# Patient Record
Sex: FEMALE | Race: WHITE | HISPANIC OR LATINO
[De-identification: names, ages, dates, MRNs, and addresses within clinical notes are randomized per-mention and may not be internally consistent; named-entity substitution may affect disease eponyms.]

---

## 2017-01-25 ENCOUNTER — APPOINTMENT (OUTPATIENT)
Dept: OTOLARYNGOLOGY | Facility: CLINIC | Age: 57
End: 2017-01-25

## 2017-01-25 VITALS
WEIGHT: 155 LBS | HEART RATE: 101 BPM | DIASTOLIC BLOOD PRESSURE: 78 MMHG | HEIGHT: 64 IN | SYSTOLIC BLOOD PRESSURE: 98 MMHG | BODY MASS INDEX: 26.46 KG/M2

## 2017-01-25 DIAGNOSIS — Z78.9 OTHER SPECIFIED HEALTH STATUS: ICD-10-CM

## 2017-01-25 DIAGNOSIS — Z83.3 FAMILY HISTORY OF DIABETES MELLITUS: ICD-10-CM

## 2017-01-25 DIAGNOSIS — Z80.9 FAMILY HISTORY OF MALIGNANT NEOPLASM, UNSPECIFIED: ICD-10-CM

## 2017-01-25 DIAGNOSIS — H93.13 TINNITUS, BILATERAL: ICD-10-CM

## 2017-01-25 DIAGNOSIS — Z88.9 ALLERGY STATUS TO UNSPECIFIED DRUGS, MEDICAMENTS AND BIOLOGICAL SUBSTANCES: ICD-10-CM

## 2017-01-25 DIAGNOSIS — H90.5 UNSPECIFIED SENSORINEURAL HEARING LOSS: ICD-10-CM

## 2017-01-25 DIAGNOSIS — Z87.09 PERSONAL HISTORY OF OTHER DISEASES OF THE RESPIRATORY SYSTEM: ICD-10-CM

## 2017-01-25 RX ORDER — BUPROPION HCL 150 MG
150 TABLET,SUSTAINED-RELEASE 12 HR ORAL
Refills: 0 | Status: ACTIVE | COMMUNITY

## 2017-01-25 RX ORDER — ESCITALOPRAM OXALATE 20 MG/1
20 TABLET, FILM COATED ORAL
Refills: 0 | Status: ACTIVE | COMMUNITY

## 2017-01-25 RX ORDER — ZOLPIDEM TARTRATE 5 MG/1
TABLET, FILM COATED ORAL
Refills: 0 | Status: ACTIVE | COMMUNITY

## 2017-01-25 RX ORDER — ASPIRIN 81 MG
81 TABLET, DELAYED RELEASE (ENTERIC COATED) ORAL
Refills: 0 | Status: ACTIVE | COMMUNITY

## 2017-01-25 RX ORDER — ALPRAZOLAM 2 MG/1
TABLET ORAL
Refills: 0 | Status: ACTIVE | COMMUNITY

## 2017-02-10 LAB
ALBUMIN SERPL ELPH-MCNC: 4.5 G/DL
ALP BLD-CCNC: 68 U/L
ALT SERPL-CCNC: 25 U/L
ANION GAP SERPL CALC-SCNC: 13 MMOL/L
AST SERPL-CCNC: 22 U/L
BILIRUB SERPL-MCNC: 0.2 MG/DL
BUN SERPL-MCNC: 15 MG/DL
CALCIUM SERPL-MCNC: 9.6 MG/DL
CHLORIDE SERPL-SCNC: 98 MMOL/L
CO2 SERPL-SCNC: 26 MMOL/L
CREAT SERPL-MCNC: 0.82 MG/DL
GLUCOSE SERPL-MCNC: 92 MG/DL
POTASSIUM SERPL-SCNC: 4.2 MMOL/L
PROT SERPL-MCNC: 7.7 G/DL
SODIUM SERPL-SCNC: 137 MMOL/L

## 2017-02-23 ENCOUNTER — APPOINTMENT (OUTPATIENT)
Dept: GASTROENTEROLOGY | Facility: CLINIC | Age: 57
End: 2017-02-23

## 2017-03-10 ENCOUNTER — RESULT REVIEW (OUTPATIENT)
Age: 57
End: 2017-03-10

## 2017-05-22 ENCOUNTER — APPOINTMENT (OUTPATIENT)
Dept: GASTROENTEROLOGY | Facility: CLINIC | Age: 57
End: 2017-05-22

## 2018-01-02 ENCOUNTER — APPOINTMENT (OUTPATIENT)
Dept: PSYCHIATRY | Facility: CLINIC | Age: 58
End: 2018-01-02
Payer: COMMERCIAL

## 2018-01-02 DIAGNOSIS — F41.8 OTHER SPECIFIED ANXIETY DISORDERS: ICD-10-CM

## 2018-01-02 PROCEDURE — 99205 OFFICE O/P NEW HI 60 MIN: CPT

## 2018-01-02 RX ORDER — BUPROPION HYDROCHLORIDE 150 MG/1
150 TABLET, EXTENDED RELEASE ORAL DAILY
Qty: 90 | Refills: 0 | Status: ACTIVE | COMMUNITY
Start: 2018-01-02 | End: 1900-01-01

## 2018-01-02 RX ORDER — ESCITALOPRAM OXALATE 20 MG/1
20 TABLET ORAL DAILY
Qty: 90 | Refills: 0 | Status: ACTIVE | COMMUNITY
Start: 2018-01-02 | End: 1900-01-01

## 2018-01-02 RX ORDER — BUPROPION HYDROCHLORIDE 300 MG/1
300 TABLET, EXTENDED RELEASE ORAL DAILY
Qty: 90 | Refills: 0 | Status: ACTIVE | COMMUNITY
Start: 2018-01-02 | End: 1900-01-01

## 2018-01-02 RX ORDER — ZALEPLON 10 MG/1
10 CAPSULE ORAL
Qty: 30 | Refills: 0 | Status: ACTIVE | COMMUNITY
Start: 2018-01-02 | End: 1900-01-01

## 2018-01-02 RX ORDER — ALPRAZOLAM 1 MG/1
1 TABLET ORAL TWICE DAILY
Qty: 60 | Refills: 0 | Status: ACTIVE | COMMUNITY
Start: 2018-01-02 | End: 1900-01-01

## 2018-02-27 ENCOUNTER — APPOINTMENT (OUTPATIENT)
Dept: PSYCHIATRY | Facility: CLINIC | Age: 58
End: 2018-02-27

## 2018-07-25 PROBLEM — Z78.9 ALCOHOL USE: Status: ACTIVE | Noted: 2017-01-25

## 2022-08-22 ENCOUNTER — APPOINTMENT (OUTPATIENT)
Dept: ORTHOPEDIC SURGERY | Facility: CLINIC | Age: 62
End: 2022-08-22

## 2022-08-22 PROCEDURE — 73110 X-RAY EXAM OF WRIST: CPT | Mod: RT

## 2022-08-22 PROCEDURE — 73030 X-RAY EXAM OF SHOULDER: CPT | Mod: RT

## 2022-08-22 PROCEDURE — 99203 OFFICE O/P NEW LOW 30 MIN: CPT

## 2022-08-22 RX ORDER — MEPOLIZUMAB 100 MG/ML
100 INJECTION, SOLUTION SUBCUTANEOUS
Qty: 1 | Refills: 0 | Status: ACTIVE | COMMUNITY
Start: 2022-07-29

## 2022-08-22 RX ORDER — NAPROXEN 500 MG/1
500 TABLET ORAL
Qty: 40 | Refills: 0 | Status: ACTIVE | COMMUNITY
Start: 2022-02-23

## 2022-08-22 RX ORDER — ESCITALOPRAM OXALATE 10 MG/1
10 TABLET ORAL
Qty: 30 | Refills: 0 | Status: ACTIVE | COMMUNITY
Start: 2022-07-08

## 2022-08-22 RX ORDER — PREDNISONE 5 MG/1
5 TABLET ORAL
Qty: 120 | Refills: 0 | Status: ACTIVE | COMMUNITY
Start: 2022-05-13

## 2022-08-22 RX ORDER — CELECOXIB 200 MG/1
200 CAPSULE ORAL DAILY
Qty: 30 | Refills: 0 | Status: ACTIVE | COMMUNITY
Start: 2022-08-22 | End: 1900-01-01

## 2022-08-22 RX ORDER — CICLOPIROX OLAMINE 7.7 MG/G
0.77 CREAM TOPICAL
Qty: 30 | Refills: 0 | Status: ACTIVE | COMMUNITY
Start: 2022-02-28

## 2022-08-22 RX ORDER — TOPIRAMATE 25 MG/1
25 TABLET, FILM COATED ORAL
Qty: 30 | Refills: 0 | Status: ACTIVE | COMMUNITY
Start: 2022-07-21

## 2022-08-22 RX ORDER — ALBUTEROL SULFATE 90 UG/1
108 (90 BASE) INHALANT RESPIRATORY (INHALATION)
Qty: 18 | Refills: 0 | Status: ACTIVE | COMMUNITY
Start: 2022-06-01

## 2022-08-22 RX ORDER — PREDNISONE 10 MG/1
10 TABLET ORAL
Qty: 30 | Refills: 0 | Status: ACTIVE | COMMUNITY
Start: 2022-04-28

## 2022-08-22 RX ORDER — ZOLPIDEM TARTRATE 10 MG/1
10 TABLET ORAL
Qty: 30 | Refills: 0 | Status: ACTIVE | COMMUNITY
Start: 2022-07-08

## 2022-08-22 RX ORDER — SUMATRIPTAN 100 MG/1
100 TABLET, FILM COATED ORAL
Qty: 9 | Refills: 0 | Status: ACTIVE | COMMUNITY
Start: 2022-06-15

## 2022-08-22 RX ORDER — BECLOMETHASONE DIPROPIONATE HFA 80 UG/1
80 AEROSOL, METERED RESPIRATORY (INHALATION)
Qty: 11 | Refills: 0 | Status: ACTIVE | COMMUNITY
Start: 2022-06-01

## 2022-08-23 NOTE — END OF VISIT
[FreeTextEntry3] : All medical record entries made by the Scribe were at my,  Dr. Cody Mario MD., direction and personally dictated by me on 08/22/2022. I have personally reviewed the chart and agree that the record accurately reflects my personal performance of the history, physical exam, assessment and plan.

## 2022-08-23 NOTE — HISTORY OF PRESENT ILLNESS
[de-identified] : Pt is a 61 y/o female c/o right shoulder pain x 4 months.  Denies injury.  She has pain when she lifts her arm overhead.  The pain is sharp.  She does not have much pain at other times although she states that the shoulder aches at rest intermittently.  \par She also c/o right wrist pain for many years.  She has been told that she has tendinitis.  She states that she feel like she has to wrap the wrist for stabilization.

## 2022-08-23 NOTE — DISCUSSION/SUMMARY
[de-identified] : The underlying pathophysiology was reviewed with the patient. XR films were reviewed with the patient. Discussed at length the nature of the patient’s condition.\par \par With regard to the right shoulder, we discussed treatment recommendations consisting of a cortisone injection, PT and oral as well as topical antiinflammatories. She deferred a cortisone injection and would like to proceed with PT at this time.\par The patient wishes to proceed with physical therapy. A script was given.		\par \par With regard to the right wrist, she deferred a cortisone injection. I recommended bracing as well as hand therapy.\par The patient wishes to proceed with hand therapy. A script was given.\par \par RX: Celebrex 200mg, once daily.\par \par All questions answered, understanding verbalized. Patient in agreement with plan of care. Follow up as needed.

## 2022-08-23 NOTE — ADDENDUM
[FreeTextEntry1] : I, Criselda Stanton wrote this note acting as a scribe for Dr. Cody Mario on Aug 22, 2022.

## 2022-08-23 NOTE — PHYSICAL EXAM
[de-identified] : Patient is WDWN, alert, and in no acute distress. Breathing is unlabored. She is grossly oriented to person, place, and time.\par \par Right Wrist:\par Full flexion and extension.\par No pain elicited with resisted extension.\par Minimal pain elicited with resisted flexion.\par \par Right Shoulder: \par Inspection/ Palpation: there is no tenderness, swelling, or deformities. \par Range of Motion: active flexion is full with pain. ABD and IR are limited with pain.\par Stability: no joint instability on provocative testing.  [de-identified] : AP, transcapula, and axillary views of the RIGHT shoulder were obtained and revealed no abnormalities. No acute fracture. No dislocation. Cartilage spaces are maintained.\par \par AP, lateral and oblique views of the RIGHT wrist were obtained today and revealed no abnormalities. No acute fracture. No dislocation. Cartilage spaces are maintained.

## 2022-12-08 ENCOUNTER — APPOINTMENT (OUTPATIENT)
Dept: ORTHOPEDIC SURGERY | Facility: CLINIC | Age: 62
End: 2022-12-08

## 2022-12-08 PROCEDURE — 20610 DRAIN/INJ JOINT/BURSA W/O US: CPT

## 2022-12-08 PROCEDURE — 99213 OFFICE O/P EST LOW 20 MIN: CPT | Mod: 25

## 2022-12-08 NOTE — END OF VISIT
[FreeTextEntry3] : All medical record entries made by the Scribe were at my,  Dr. Cody Mario MD., direction and personally dictated by me on 12/08/2022. I have personally reviewed the chart and agree that the record accurately reflects my personal performance of the history, physical exam, assessment and plan.

## 2022-12-08 NOTE — ADDENDUM
[FreeTextEntry1] : I, Criselda Stanton wrote this note acting as a scribe for Dr. Cody Mario on Dec 08, 2022.

## 2022-12-08 NOTE — DISCUSSION/SUMMARY
[de-identified] : The underlying pathophysiology was reviewed with the patient. XR films were reviewed with the patient. Discussed at length the nature of the patient’s condition.\par \par With regard to the right shoulder, given her continued symptoms and lack of relief from PT, I recommended a cortisone injection at the posterior subacromial space. I also advised an MRI for further evaluation, to be obtained if only she does not get relief from the cortisone injection.\par The patient wishes to proceed with a cortisone injection today. Under sterile precautions, an injection of 5cc 1% lidocaine without epinephrine and 0.5cc Kenalog 40mg, 0.5cc Dexamethasone was administered into the RIGHT posterior subacromial joint space (1). The patient tolerated the procedure well. Rest and apply ice. \par An MRI of the right shoulder was ordered to evaluate for rotator cuff tear Patient will follow-up to review the results and discuss further treatment recommendations.\par \par All questions answered, understanding verbalized. Patient in agreement with plan of care.

## 2022-12-08 NOTE — HISTORY OF PRESENT ILLNESS
[de-identified] : Pt is a 63 y/o female who presents with complaints of right shoulder pain. She was last seen in the office on 8/22/22 at which time I referred her for physical therapy. She returns in follow up on 12/8/22 and reports a great deal of continued pain to the right shoulder. She has been in physical therapy without relief of her symptoms. She would like to discuss further treatment options given her continued symptoms.\par \par She also again complains of right wrist pain. She was also seen for this at the time of her last visit on 8/22/22. She deferred an injection and was referred for hand therapy at that time. She reports continued pain although she would like to first treat the right shoulder prior to further treatment in this regard.

## 2022-12-08 NOTE — PHYSICAL EXAM
[de-identified] : Patient is WDWN, alert, and in no acute distress. Breathing is unlabored. She is grossly oriented to person, place, and time.\par \par Right Wrist:\par Full flexion and extension.\par No pain elicited with resisted extension.\par Minimal pain elicited with resisted flexion.\par \par Right Shoulder: \par Inspection/ Palpation: there is no tenderness, swelling, or deformities. \par Range of Motion: active flexion is full with pain. ABD and IR are limited with pain.\par Stability: no joint instability on provocative testing.  [de-identified] : no new imaging today

## 2022-12-12 ENCOUNTER — APPOINTMENT (OUTPATIENT)
Dept: OBGYN | Facility: CLINIC | Age: 62
End: 2022-12-12

## 2023-02-28 ENCOUNTER — APPOINTMENT (OUTPATIENT)
Dept: ORTHOPEDIC SURGERY | Facility: CLINIC | Age: 63
End: 2023-02-28

## 2023-03-23 ENCOUNTER — APPOINTMENT (OUTPATIENT)
Dept: ORTHOPEDIC SURGERY | Facility: CLINIC | Age: 63
End: 2023-03-23
Payer: MEDICAID

## 2023-03-23 VITALS — WEIGHT: 183 LBS | HEIGHT: 64 IN | BODY MASS INDEX: 31.24 KG/M2

## 2023-03-23 DIAGNOSIS — M25.511 PAIN IN RIGHT SHOULDER: ICD-10-CM

## 2023-03-23 DIAGNOSIS — M25.531 PAIN IN RIGHT WRIST: ICD-10-CM

## 2023-03-23 DIAGNOSIS — M25.512 PAIN IN RIGHT SHOULDER: ICD-10-CM

## 2023-03-23 PROCEDURE — 99214 OFFICE O/P EST MOD 30 MIN: CPT | Mod: 25

## 2023-03-23 PROCEDURE — 20605 DRAIN/INJ JOINT/BURSA W/O US: CPT | Mod: RT

## 2023-03-23 PROCEDURE — 73030 X-RAY EXAM OF SHOULDER: CPT | Mod: LT

## 2023-03-23 PROCEDURE — 20610 DRAIN/INJ JOINT/BURSA W/O US: CPT | Mod: LT

## 2023-03-24 PROBLEM — M25.531 RIGHT WRIST PAIN: Status: ACTIVE | Noted: 2022-08-22

## 2023-03-24 PROBLEM — M25.511 SHOULDER PAIN, RIGHT: Status: ACTIVE | Noted: 2022-08-22

## 2023-03-24 NOTE — PHYSICAL EXAM
[de-identified] : Patient is WDWN, alert, and in no acute distress. Breathing is unlabored. She is grossly oriented to person, place, and time.\par \par Right Wrist: \par There is tenderness over the first dorsal compartment at the level of the radial styloid. Swelling is localized to the area. There are no visible deformities. The ROM is full in all planes with no crepitus. There is no joint instability on provocative testing. Sensation is normal.\par Range of Motion: Pain is elicited with resisted radial deviation of the wrist. \par Tests/Signs: Finkelstein's test is positive.\par \par Bilateral Shoulders: \par Inspection/ Palpation: there is no tenderness, swelling, or deformities. \par Range of Motion: active flexion is full with pain. ABD and IR are limited with pain.\par Stability: no joint instability on provocative testing.  [de-identified] : AP, transcapula, and axillary views of the LEFT shoulder were obtained today and revealed no abnormalities. No acute fracture. No dislocation. Cartilage spaces are maintained. \par \par AP, lateral and oblique views of the RIGHT wrist were obtained on 8/22/22 and revealed no abnormalities. No acute fracture. No dislocation. Cartilage spaces are maintained.

## 2023-03-24 NOTE — END OF VISIT
[FreeTextEntry3] : All medical record entries made by the Scribe were at my,  Dr. Cody Mario MD., direction and personally dictated by me on 03/23/2023. I have personally reviewed the chart and agree that the record accurately reflects my personal performance of the history, physical exam, assessment and plan.

## 2023-03-24 NOTE — HISTORY OF PRESENT ILLNESS
[de-identified] : Jimy is a 61 y/o female who presents with complaints of right shoulder pain. She was last seen in the office on 8/22/22 at which time I referred her for physical therapy. She returns in follow up on 12/8/22 and reports a great deal of continued pain to the right shoulder. She has been in physical therapy without relief of her symptoms. She was referred for an MRI and given a cortisone injection at the right shoulder, at the time of her last visit. She returns on 3/23/23 and notes continued pain to the shoulder and little to no relief from the cortisone injection.\par \par She additionally complains of left shoulder pain which recently began is of a spontaneous onset. She denies injury. She has tried PT, without relief. She would like an injection today.\par \par She also again complains of right wrist pain. She was also seen for this at the time of her most recent visit on 12/8/22. She deferred an injection and was referred for hand therapy at the time of her office visit in August. In December she last reported continued pain although she wanted to first treat her shoulder prior to treatment for her wrist. She returns on 3/23/23 and would like an injection today.

## 2023-03-24 NOTE — DISCUSSION/SUMMARY
[de-identified] : The underlying pathophysiology was reviewed with the patient. XR films were reviewed with the patient. Discussed at length the nature of the patient’s condition.\par \par With regard to the left shoulder, at her request she was given a cortisone injection at the posterior subacromial space.\par The patient wishes to proceed with a cortisone injection today. Under sterile precautions, an injection of 5cc 1% lidocaine without epinephrine and 0.5cc Kenalog 40mg, 0.5cc Dexamethasone was administered into the LEFT posterior subacromial joint space (1). The patient tolerated the procedure well. Rest and apply ice.\par \par With regard to the right wrist, I recommended a cortisone injection at the first dorsal compartment.\par The patient wishes to proceed with a cortisone injection at this time. The skin was prepped with alcohol and sprayed with Ethyl Chloride. An injection of 0.5 cc 1% Lidocaine without epinephrine, 0.25 cc Kenalog 40 mg, and 0.25 cc Dexamethasone was administered into the RIGHT first extensor compartment (1/3) The patient tolerated the procedure well. Rest and apply ice. \par \par All questions answered, understanding verbalized. Patient in agreement with plan of care.

## 2023-03-24 NOTE — ADDENDUM
[FreeTextEntry1] : I, Criselda Stanton wrote this note acting as a scribe for Dr. Cody Mario on Mar 23, 2023.

## 2023-12-13 ENCOUNTER — APPOINTMENT (OUTPATIENT)
Dept: PULMONOLOGY | Facility: CLINIC | Age: 63
End: 2023-12-13

## 2024-12-24 PROBLEM — F10.90 ALCOHOL USE: Status: ACTIVE | Noted: 2017-01-25

## 2025-08-05 ENCOUNTER — NON-APPOINTMENT (OUTPATIENT)
Age: 65
End: 2025-08-05

## 2025-08-06 ENCOUNTER — APPOINTMENT (OUTPATIENT)
Dept: OTOLARYNGOLOGY | Facility: CLINIC | Age: 65
End: 2025-08-06